# Patient Record
Sex: FEMALE | Race: BLACK OR AFRICAN AMERICAN | NOT HISPANIC OR LATINO | Employment: OTHER | URBAN - METROPOLITAN AREA
[De-identification: names, ages, dates, MRNs, and addresses within clinical notes are randomized per-mention and may not be internally consistent; named-entity substitution may affect disease eponyms.]

---

## 2020-03-02 PROBLEM — I34.0 MITRAL REGURGITATION: Status: ACTIVE | Noted: 2019-02-27

## 2020-03-02 PROBLEM — I25.10 CORONARY ARTERY DISEASE INVOLVING NATIVE CORONARY ARTERY OF NATIVE HEART WITHOUT ANGINA PECTORIS: Status: ACTIVE | Noted: 2020-03-02

## 2020-03-02 PROBLEM — E78.5 HYPERLIPIDEMIA: Status: ACTIVE | Noted: 2018-10-28

## 2020-03-02 PROBLEM — J44.9 COPD (CHRONIC OBSTRUCTIVE PULMONARY DISEASE): Status: ACTIVE | Noted: 2018-10-28

## 2020-03-02 PROBLEM — Z95.1 S/P CABG (CORONARY ARTERY BYPASS GRAFT): Status: ACTIVE | Noted: 2020-03-02

## 2020-03-02 PROBLEM — I10 ESSENTIAL HYPERTENSION: Status: ACTIVE | Noted: 2018-10-28

## 2020-03-02 PROBLEM — I48.0 PAROXYSMAL ATRIAL FIBRILLATION: Status: ACTIVE | Noted: 2017-10-13

## 2020-03-02 PROBLEM — H25.819 COMBINED FORMS OF AGE-RELATED CATARACT: Status: ACTIVE | Noted: 2019-05-23

## 2020-03-02 PROBLEM — Z79.01 CHRONIC ANTICOAGULATION: Status: ACTIVE | Noted: 2018-10-28

## 2020-03-02 PROBLEM — I42.0 DILATED CARDIOMYOPATHY: Status: ACTIVE | Noted: 2018-10-28

## 2020-08-12 PROBLEM — R06.02 SHORTNESS OF BREATH: Status: ACTIVE | Noted: 2020-08-12

## 2020-08-12 PROBLEM — I50.9 CONGESTIVE HEART FAILURE: Status: ACTIVE | Noted: 2020-08-12

## 2020-08-12 PROBLEM — R60.0 BILATERAL LEG EDEMA: Status: ACTIVE | Noted: 2020-08-12

## 2020-08-13 PROBLEM — E88.09 HYPOALBUMINEMIA: Status: ACTIVE | Noted: 2020-08-13

## 2020-08-13 PROBLEM — I50.9 CONGESTIVE HEART FAILURE: Status: RESOLVED | Noted: 2020-08-12 | Resolved: 2020-08-13

## 2020-08-18 PROBLEM — I27.20 PULMONARY HYPERTENSION: Status: ACTIVE | Noted: 2020-08-18

## 2020-09-14 ENCOUNTER — NURSE TRIAGE (OUTPATIENT)
Dept: ADMINISTRATIVE | Facility: CLINIC | Age: 80
End: 2020-09-14

## 2020-09-14 NOTE — TELEPHONE ENCOUNTER
[id number] Post-procedure call to [phone]; denies symptoms of COVID-19 including Cough; fever, SOB, Chest pains, or difficulty breathing. Advised to call [phone] with any onset of symptoms or further questions; Call 91 or go to nearest ER if it is a life threatening emergency; patient verbalizes understanding and agrees to follow advice given.     Reason for Disposition   [1] Follow-up call to recent contact AND [2] information only call, no triage required    Additional Information   Negative: [1] Caller is not with the adult (patient) AND [2] reporting urgent symptoms   Negative: Lab result questions   Negative: Medication questions   Negative: Caller can't be reached by phone   Negative: Caller has already spoken to PCP or another triager   Negative: RN needs further essential information from caller in order to complete triage   Negative: Requesting regular office appointment   Negative: [1] Caller requesting NON-URGENT health information AND [2] PCP's office is the best resource   Negative: Health Information question, no triage required and triager able to answer question   Negative: General information question, no triage required and triager able to answer question   Negative: Question about upcoming scheduled test, no triage required and triager able to answer question   Negative: [1] Caller is not with the adult (patient) AND [2] probable NON-URGENT symptoms    Protocols used: INFORMATION ONLY CALL-A-

## 2020-12-31 PROBLEM — R74.01 TRANSAMINITIS: Status: ACTIVE | Noted: 2020-12-31

## 2020-12-31 PROBLEM — D64.9 ANEMIA: Status: ACTIVE | Noted: 2020-12-31

## 2020-12-31 PROBLEM — N17.9 AKI (ACUTE KIDNEY INJURY): Status: ACTIVE | Noted: 2020-12-31

## 2021-01-28 PROBLEM — I50.9 ACUTE ON CHRONIC CONGESTIVE HEART FAILURE: Status: ACTIVE | Noted: 2021-01-28

## 2021-01-30 PROBLEM — R53.81 PHYSICAL DEBILITY: Status: ACTIVE | Noted: 2021-01-30

## 2021-01-31 PROBLEM — E05.90 HYPERTHYROIDISM: Status: RESOLVED | Noted: 2021-01-31 | Resolved: 2021-01-31

## 2021-01-31 PROBLEM — E05.90 HYPERTHYROIDISM: Status: ACTIVE | Noted: 2021-01-31

## 2021-01-31 PROBLEM — E03.9 HYPOTHYROIDISM: Status: ACTIVE | Noted: 2021-01-31

## 2021-02-01 PROBLEM — E66.01 CLASS 2 SEVERE OBESITY WITH SERIOUS COMORBIDITY AND BODY MASS INDEX (BMI) OF 35.0 TO 35.9 IN ADULT: Status: ACTIVE | Noted: 2021-02-01

## 2021-02-01 PROBLEM — I50.23 ACUTE ON CHRONIC SYSTOLIC CONGESTIVE HEART FAILURE: Status: ACTIVE | Noted: 2021-01-28

## 2021-02-06 PROBLEM — I95.2 HYPOTENSION DUE TO DRUGS: Status: ACTIVE | Noted: 2021-02-06

## 2021-02-06 PROBLEM — J96.02 ACUTE RESPIRATORY FAILURE WITH HYPOXIA AND HYPERCARBIA: Status: ACTIVE | Noted: 2021-02-06

## 2021-02-06 PROBLEM — J96.01 ACUTE HYPOXEMIC RESPIRATORY FAILURE: Status: ACTIVE | Noted: 2021-02-06

## 2021-02-07 PROBLEM — J96.02 ACUTE RESPIRATORY FAILURE WITH HYPOXIA AND HYPERCARBIA: Status: RESOLVED | Noted: 2021-02-06 | Resolved: 2021-02-07

## 2021-02-07 PROBLEM — J96.01 ACUTE RESPIRATORY FAILURE WITH HYPOXIA AND HYPERCARBIA: Status: RESOLVED | Noted: 2021-02-06 | Resolved: 2021-02-07

## 2021-02-09 PROBLEM — I95.2 HYPOTENSION DUE TO DRUGS: Status: RESOLVED | Noted: 2021-02-06 | Resolved: 2021-02-09

## 2021-02-09 PROBLEM — I10 ESSENTIAL HYPERTENSION: Status: RESOLVED | Noted: 2018-10-28 | Resolved: 2021-02-09

## 2024-02-22 PROBLEM — I50.23 ACUTE ON CHRONIC SYSTOLIC CONGESTIVE HEART FAILURE: Status: RESOLVED | Noted: 2021-01-28 | Resolved: 2024-02-22

## 2024-02-22 PROBLEM — D69.6 THROMBOCYTOPENIA, UNSPECIFIED: Status: ACTIVE | Noted: 2024-02-22

## 2024-03-22 ENCOUNTER — PATIENT OUTREACH (OUTPATIENT)
Dept: ADMINISTRATIVE | Facility: OTHER | Age: 84
End: 2024-03-22

## 2024-03-22 NOTE — PROGRESS NOTES
Cm referral for HH  Per chart AUBREE Gregory LPN ()  Encounter Date: 2/22/2024 HH orders were faxed     CHW will close case at this time

## 2024-03-28 PROBLEM — R79.89 ELEVATED TROPONIN: Status: ACTIVE | Noted: 2024-03-28

## 2024-03-28 PROBLEM — I48.11 LONGSTANDING PERSISTENT ATRIAL FIBRILLATION: Status: ACTIVE | Noted: 2024-03-28

## 2024-03-28 PROBLEM — I50.9 CHF EXACERBATION: Status: ACTIVE | Noted: 2024-03-28

## 2024-03-29 PROBLEM — I50.23 ACUTE ON CHRONIC SYSTOLIC CONGESTIVE HEART FAILURE: Status: ACTIVE | Noted: 2024-03-28

## 2024-03-29 PROBLEM — E11.22 TYPE 2 DIABETES MELLITUS WITH STAGE 4 CHRONIC KIDNEY DISEASE, WITH LONG-TERM CURRENT USE OF INSULIN: Status: ACTIVE | Noted: 2024-03-29

## 2024-03-29 PROBLEM — N18.4 TYPE 2 DIABETES MELLITUS WITH STAGE 4 CHRONIC KIDNEY DISEASE, WITH LONG-TERM CURRENT USE OF INSULIN: Status: ACTIVE | Noted: 2024-03-29

## 2024-03-29 PROBLEM — Z79.4 TYPE 2 DIABETES MELLITUS WITH STAGE 4 CHRONIC KIDNEY DISEASE, WITH LONG-TERM CURRENT USE OF INSULIN: Status: ACTIVE | Noted: 2024-03-29

## 2024-04-01 PROBLEM — I50.23 ACUTE ON CHRONIC SYSTOLIC CONGESTIVE HEART FAILURE: Status: RESOLVED | Noted: 2024-03-28 | Resolved: 2024-04-01

## 2024-04-03 ENCOUNTER — PATIENT OUTREACH (OUTPATIENT)
Dept: ADMINISTRATIVE | Facility: CLINIC | Age: 84
End: 2024-04-03

## 2024-04-03 ENCOUNTER — PATIENT MESSAGE (OUTPATIENT)
Dept: ADMINISTRATIVE | Facility: CLINIC | Age: 84
End: 2024-04-03

## 2024-04-03 NOTE — PROGRESS NOTES
C3 nurse attempted to contact Ro BURNETTE Tennillenadeen  for a TCC post hospital discharge follow up call. No answer. Left voicemail with callback information. The patient has a scheduled HOSFU appointment with FADY Jones  on 04/09/2024 @ 1300.     Message sent to PCP staff

## 2024-04-04 NOTE — PROGRESS NOTES
Taking Tylenol  mg PRN      C3 nurse spoke with Ro Lakhani ( son )  for a TCC post hospital discharge follow up call. The patient has a scheduled HOSFU appointment with Óscar Pate MD on 04/09/2024 @ 1300.    Message sent to PCP staff

## 2024-04-25 PROBLEM — N30.01 ACUTE CYSTITIS WITH HEMATURIA: Status: ACTIVE | Noted: 2024-04-25

## 2024-04-25 PROBLEM — T68.XXXA HYPOTHERMIA: Status: ACTIVE | Noted: 2024-04-25

## 2024-04-25 PROBLEM — E16.2 HYPOGLYCEMIA: Status: ACTIVE | Noted: 2024-04-25

## 2024-04-25 PROBLEM — J81.0 FLASH PULMONARY EDEMA: Status: ACTIVE | Noted: 2024-04-25

## 2024-04-27 PROBLEM — N18.32 CHRONIC KIDNEY DISEASE, STAGE 3B: Status: ACTIVE | Noted: 2024-04-27

## 2024-04-27 PROBLEM — I10 PRIMARY HYPERTENSION: Status: ACTIVE | Noted: 2024-04-27

## 2024-04-28 PROBLEM — T68.XXXA HYPOTHERMIA: Status: RESOLVED | Noted: 2024-04-25 | Resolved: 2024-04-28

## 2024-04-29 PROBLEM — M79.89 LEFT ARM SWELLING: Status: ACTIVE | Noted: 2024-04-29

## 2024-04-30 PROBLEM — E87.3 ALKALOSIS: Status: ACTIVE | Noted: 2024-04-30

## 2024-05-04 PROBLEM — E87.6 HYPOKALEMIA: Status: ACTIVE | Noted: 2024-05-04

## 2024-05-06 PROBLEM — N04.9 ANASARCA ASSOCIATED WITH DISORDER OF KIDNEY: Status: ACTIVE | Noted: 2024-05-06
